# Patient Record
Sex: FEMALE | Race: WHITE | NOT HISPANIC OR LATINO | Employment: FULL TIME | URBAN - METROPOLITAN AREA
[De-identification: names, ages, dates, MRNs, and addresses within clinical notes are randomized per-mention and may not be internally consistent; named-entity substitution may affect disease eponyms.]

---

## 2023-10-25 ENCOUNTER — TELEPHONE (OUTPATIENT)
Dept: RHEUMATOLOGY | Facility: CLINIC | Age: 58
End: 2023-10-25
Payer: COMMERCIAL

## 2023-10-25 NOTE — TELEPHONE ENCOUNTER
Patient has an appointment 12/1.  She hopes to get in before 11/22/2023 for an injection.  Is it ok to put her in a 15 minute appointment.

## 2023-11-11 PROBLEM — E89.2 STATUS POST PARATHYROIDECTOMY (CMS/HCC): Status: RESOLVED | Noted: 2023-11-11 | Resolved: 2023-11-11

## 2023-11-11 PROBLEM — E89.2 STATUS POST PARATHYROIDECTOMY (CMS/HCC): Status: ACTIVE | Noted: 2023-11-11

## 2023-11-11 PROBLEM — M15.9 GENERALIZED OSTEOARTHRITIS: Status: ACTIVE | Noted: 2023-11-11

## 2023-11-11 PROBLEM — I10 BENIGN ESSENTIAL HYPERTENSION: Status: ACTIVE | Noted: 2023-11-11

## 2023-11-11 PROBLEM — E21.0 HYPERPARATHYROIDISM, PRIMARY (MULTI): Status: ACTIVE | Noted: 2023-11-11

## 2023-11-11 PROBLEM — I83.90 VARICOSE VEIN OF LEG: Status: ACTIVE | Noted: 2023-11-11

## 2023-11-11 RX ORDER — LEVOTHYROXINE SODIUM 100 UG/1
100 TABLET ORAL DAILY
COMMUNITY

## 2023-11-11 RX ORDER — NEBIVOLOL 10 MG/1
15 TABLET ORAL DAILY
COMMUNITY

## 2023-11-11 RX ORDER — CHOLECALCIFEROL (VITAMIN D3) 1250 MCG
1 TABLET ORAL
COMMUNITY

## 2023-11-11 RX ORDER — LOSARTAN POTASSIUM 100 MG/1
100 TABLET ORAL DAILY
COMMUNITY
Start: 2023-10-07

## 2023-11-15 ENCOUNTER — OFFICE VISIT (OUTPATIENT)
Dept: RHEUMATOLOGY | Facility: CLINIC | Age: 58
End: 2023-11-15
Payer: COMMERCIAL

## 2023-11-15 VITALS
SYSTOLIC BLOOD PRESSURE: 175 MMHG | HEART RATE: 61 BPM | WEIGHT: 204.5 LBS | DIASTOLIC BLOOD PRESSURE: 91 MMHG | BODY MASS INDEX: 34.07 KG/M2 | TEMPERATURE: 96.6 F | HEIGHT: 65 IN

## 2023-11-15 DIAGNOSIS — M15.9 GENERALIZED OSTEOARTHRITIS: Primary | ICD-10-CM

## 2023-11-15 PROBLEM — E11.9 TYPE 2 DIABETES MELLITUS, WITHOUT LONG-TERM CURRENT USE OF INSULIN (MULTI): Status: ACTIVE | Noted: 2022-12-08

## 2023-11-15 PROBLEM — E55.9 VITAMIN D DEFICIENCY: Status: RESOLVED | Noted: 2023-11-15 | Resolved: 2023-11-15

## 2023-11-15 PROCEDURE — 99213 OFFICE O/P EST LOW 20 MIN: CPT | Performed by: INTERNAL MEDICINE

## 2023-11-15 PROCEDURE — 20610 DRAIN/INJ JOINT/BURSA W/O US: CPT | Performed by: INTERNAL MEDICINE

## 2023-11-15 PROCEDURE — 1036F TOBACCO NON-USER: CPT | Performed by: INTERNAL MEDICINE

## 2023-11-15 PROCEDURE — 3077F SYST BP >= 140 MM HG: CPT | Performed by: INTERNAL MEDICINE

## 2023-11-15 PROCEDURE — 4010F ACE/ARB THERAPY RXD/TAKEN: CPT | Performed by: INTERNAL MEDICINE

## 2023-11-15 PROCEDURE — 3080F DIAST BP >= 90 MM HG: CPT | Performed by: INTERNAL MEDICINE

## 2023-11-15 RX ORDER — CALCIUM CARBONATE 600 MG
TABLET ORAL DAILY
COMMUNITY
Start: 2021-06-29

## 2023-11-15 RX ORDER — NABUMETONE 750 MG/1
750 TABLET, FILM COATED ORAL 2 TIMES DAILY PRN
Qty: 60 TABLET | Refills: 5 | Status: SHIPPED | OUTPATIENT
Start: 2023-11-15 | End: 2024-07-12

## 2023-11-15 RX ORDER — TRIAMCINOLONE ACETONIDE 40 MG/ML
40 INJECTION, SUSPENSION INTRA-ARTICULAR; INTRAMUSCULAR
Status: COMPLETED | OUTPATIENT
Start: 2023-11-15 | End: 2023-11-15

## 2023-11-15 RX ADMIN — TRIAMCINOLONE ACETONIDE 40 MG: 40 INJECTION, SUSPENSION INTRA-ARTICULAR; INTRAMUSCULAR at 11:44

## 2023-11-15 ASSESSMENT — PATIENT HEALTH QUESTIONNAIRE - PHQ9
2. FEELING DOWN, DEPRESSED OR HOPELESS: NOT AT ALL
1. LITTLE INTEREST OR PLEASURE IN DOING THINGS: NOT AT ALL
SUM OF ALL RESPONSES TO PHQ9 QUESTIONS 1 AND 2: 0

## 2023-11-15 ASSESSMENT — ENCOUNTER SYMPTOMS
MYALGIAS: 1
SHORTNESS OF BREATH: 0
HEADACHES: 0
WEAKNESS: 0
NUMBNESS: 0
DIZZINESS: 0
BRUISES/BLEEDS EASILY: 0
COLOR CHANGE: 0
UNEXPECTED WEIGHT CHANGE: 0
JOINT SWELLING: 1
COUGH: 0
BACK PAIN: 0
FEVER: 0
STIFFNESS: 1
ARTHRALGIAS: 1

## 2023-11-15 NOTE — PATIENT INSTRUCTIONS
It was a pleasure to see you today  Please call if your symptoms worsen  Please review your summary for education and reminders.  Follow up at your next appointment.    If you had labs/xrays done today, you will be able to view on [employer].   We will contact you when the results are reviewed for further discussion.  Please note that you may receive your results before I have had a chance to review.  Please know I will be contacting you for discussion  Homegoing instructions for all patient  A healthy lifestyle helps chronic diseases  These are all the goals you should strive to improve your overall health   Blood pressure <130/85   BMI of <30 or waist circumference that is 1/2 of your height   Fasting blood sugar <107 (if you are diabetic, aim for an A1c <6.4%_   LDL cholesterol <130   Avoid smoking   Manage your stress   Get your preventive exams   Get your immunizations

## 2023-11-15 NOTE — LETTER
November 15, 2023     Barney Montez MD  [address]  [facility], [address]  [facility] [address]    Patient: Yokasta Daniel   YOB: 1965   Date of Visit: 11/15/2023       Dear Dr. Barney Montez MD:    Thank you for referring Yokasta Daniel to me for evaluation. Below are my notes for this consultation.  If you have questions, please do not hesitate to call me. I look forward to following your patient along with you.       Sincerely,     Caty Das MD      CC: No Recipients  ______________________________________________________________________________________    Subjective   Patient ID: Yokasta Daniel is a 58 y.o. female who presents for Arthritis and Injections.  Pt needs injections of knees before she leaves to travel for the holiday.  Noting diffuse pain in most joints.   Meloxicam didn't really help    Arthritis  Presents for follow-up visit. She complains of pain, stiffness and joint swelling. Affected locations include the left knee, right knee, right MCP, left MCP, left wrist and right wrist. Pertinent negatives include no fever or rash.     Patient Active Problem List    Diagnosis Date Noted   • Benign essential hypertension 11/11/2023   • Generalized osteoarthritis 11/11/2023   • Hyperparathyroidism, primary (CMS/[facility]) 11/11/2023   • Varicose vein of leg 11/11/2023   • Type 2 diabetes mellitus, without long-term current use of insulin (CMS/[facility]) 12/08/2022     Current Outpatient Medications on File Prior to Visit   Medication Sig   • calcium carbonate 600 mg calcium (1,500 mg) tablet Take by mouth once daily.   • cholecalciferol (Vitamin D3) 1,250 mcg (50,000 unit) tablet Take 1 tablet (50,000 Units) by mouth 1 (one) time per week.   • levothyroxine (Synthroid, Levoxyl) 100 mcg tablet Take 1 tablet (100 mcg) by mouth once daily.   • losartan (Cozaar) 100 mg tablet Take 1 tablet (100 mg) by mouth once daily.   • mv-min/FA/vit K/lutein/zeaxant  "(PRESERVISION AREDS 2 PLUS MV ORAL) Take by mouth.   • nebivolol (Bystolic) 10 mg tablet Take 1.5 tablets (15 mg) by mouth once daily.     No current facility-administered medications on file prior to visit.     Allergies   Allergen Reactions   • Nitrofurantoin Monohyd/M-Cryst Other, Itching and Rash     30 years ago while pregnant   • Cephalexin GI Upset and Unknown     Kidney issues         Review of Systems   Constitutional:  Negative for fever and unexpected weight change.   HENT:  Negative for congestion.    Respiratory:  Negative for cough and shortness of breath.    Cardiovascular:  Positive for leg swelling.   Musculoskeletal:  Positive for arthralgias, arthritis, gait problem, joint swelling, myalgias and stiffness. Negative for back pain.   Skin:  Negative for color change and rash.   Neurological:  Negative for dizziness, weakness, numbness and headaches.   Hematological:  Does not bruise/bleed easily.       Objective  BP (!) 175/91   Pulse 61   Temp 35.9 °C (96.6 °F)   Ht 1.651 m (5' 5\")   Wt 92.8 kg (204 lb 8 oz)   BMI 34.03 kg/m²     Physical Exam  Vitals reviewed.   Constitutional:       General: She is not in acute distress.     Appearance: Normal appearance.   HENT:      Head: Normocephalic and atraumatic.   Eyes:      Conjunctiva/sclera: Conjunctivae normal.   Cardiovascular:      Comments: varicosities  Pulmonary:      Effort: Pulmonary effort is normal. No respiratory distress.   Musculoskeletal:         General: Swelling and tenderness present. No deformity.      Right lower leg: Edema present.      Left lower leg: Edema present.      Comments: Medial joint line tenderness of knees.  OA changes in hands   Skin:     Findings: No erythema or rash.   Neurological:      General: No focal deficit present.      Mental Status: She is alert.   Psychiatric:         Mood and Affect: Mood normal.     Patient ID: Yokasta Daniel is a 58 y.o. female.    Large Joint Injection/Arthrocentesis: bilateral knee " on 11/15/2023 11:44 AM  Indications: pain  Details: 25 G needle, medial approach  Medications (Right): 40 mg triamcinolone acetonide 40 mg/mL  Aspirate (Right): 0 mL  Medications (Left): 40 mg triamcinolone acetonide 40 mg/mL  Aspirate (Left): 0 mL  Outcome: tolerated well, no immediate complications  Procedure, treatment alternatives, risks and benefits explained, specific risks discussed. Consent was given by the patient. Immediately prior to procedure a time out was called to verify the correct patient, procedure, equipment, support staff and site/side marked as required. Patient was prepped and draped in the usual sterile fashion.           Assessment/Plan   Problem List Items Addressed This Visit             ICD-10-CM    Generalized osteoarthritis - Primary M15.9     OA of knees but noting more hand pain as well.  Injections done but also will start NSAID          Follow up 3-4 mo

## 2023-11-15 NOTE — PROGRESS NOTES
Subjective   Patient ID: Yokasta Daniel is a 58 y.o. female who presents for Arthritis and Injections.  Pt needs injections of knees before she leaves to travel for the holiday.  Noting diffuse pain in most joints.   Meloxicam didn't really help    Arthritis  Presents for follow-up visit. She complains of pain, stiffness and joint swelling. Affected locations include the left knee, right knee, right MCP, left MCP, left wrist and right wrist. Pertinent negatives include no fever or rash.     Patient Active Problem List    Diagnosis Date Noted    Benign essential hypertension 11/11/2023    Generalized osteoarthritis 11/11/2023    Hyperparathyroidism, primary (CMS/[facility]) 11/11/2023    Varicose vein of leg 11/11/2023    Type 2 diabetes mellitus, without long-term current use of insulin (CMS/[facility]) 12/08/2022     Current Outpatient Medications on File Prior to Visit   Medication Sig    calcium carbonate 600 mg calcium (1,500 mg) tablet Take by mouth once daily.    cholecalciferol (Vitamin D3) 1,250 mcg (50,000 unit) tablet Take 1 tablet (50,000 Units) by mouth 1 (one) time per week.    levothyroxine (Synthroid, Levoxyl) 100 mcg tablet Take 1 tablet (100 mcg) by mouth once daily.    losartan (Cozaar) 100 mg tablet Take 1 tablet (100 mg) by mouth once daily.    mv-min/FA/vit K/lutein/zeaxant (PRESERVISION AREDS 2 PLUS MV ORAL) Take by mouth.    nebivolol (Bystolic) 10 mg tablet Take 1.5 tablets (15 mg) by mouth once daily.     No current facility-administered medications on file prior to visit.     Allergies   Allergen Reactions    Nitrofurantoin Monohyd/M-Cryst Other, Itching and Rash     30 years ago while pregnant    Cephalexin GI Upset and Unknown     Kidney issues         Review of Systems   Constitutional:  Negative for fever and unexpected weight change.   HENT:  Negative for congestion.    Respiratory:  Negative for cough and shortness of breath.    Cardiovascular:  Positive for leg swelling.   Musculoskeletal:  Positive  "for arthralgias, arthritis, gait problem, joint swelling, myalgias and stiffness. Negative for back pain.   Skin:  Negative for color change and rash.   Neurological:  Negative for dizziness, weakness, numbness and headaches.   Hematological:  Does not bruise/bleed easily.       Objective  BP (!) 175/91   Pulse 61   Temp 35.9 °C (96.6 °F)   Ht 1.651 m (5' 5\")   Wt 92.8 kg (204 lb 8 oz)   BMI 34.03 kg/m²     Physical Exam  Vitals reviewed.   Constitutional:       General: She is not in acute distress.     Appearance: Normal appearance.   HENT:      Head: Normocephalic and atraumatic.   Eyes:      Conjunctiva/sclera: Conjunctivae normal.   Cardiovascular:      Comments: varicosities  Pulmonary:      Effort: Pulmonary effort is normal. No respiratory distress.   Musculoskeletal:         General: Swelling and tenderness present. No deformity.      Right lower leg: Edema present.      Left lower leg: Edema present.      Comments: Medial joint line tenderness of knees.  OA changes in hands   Skin:     Findings: No erythema or rash.   Neurological:      General: No focal deficit present.      Mental Status: She is alert.   Psychiatric:         Mood and Affect: Mood normal.     Patient ID: Yokasta Daniel is a 58 y.o. female.    Large Joint Injection/Arthrocentesis: bilateral knee on 11/15/2023 11:44 AM  Indications: pain  Details: 25 G needle, medial approach  Medications (Right): 40 mg triamcinolone acetonide 40 mg/mL  Aspirate (Right): 0 mL  Medications (Left): 40 mg triamcinolone acetonide 40 mg/mL  Aspirate (Left): 0 mL  Outcome: tolerated well, no immediate complications  Procedure, treatment alternatives, risks and benefits explained, specific risks discussed. Consent was given by the patient. Immediately prior to procedure a time out was called to verify the correct patient, procedure, equipment, support staff and site/side marked as required. Patient was prepped and draped in the usual sterile fashion. "           Assessment/Plan   Problem List Items Addressed This Visit             ICD-10-CM    Generalized osteoarthritis - Primary M15.9     OA of knees but noting more hand pain as well.  Injections done but also will start NSAID          Follow up 3-4 mo

## 2024-03-21 ENCOUNTER — OFFICE VISIT (OUTPATIENT)
Dept: RHEUMATOLOGY | Facility: CLINIC | Age: 59
End: 2024-03-21
Payer: COMMERCIAL

## 2024-03-21 VITALS
TEMPERATURE: 96.8 F | WEIGHT: 197.9 LBS | DIASTOLIC BLOOD PRESSURE: 86 MMHG | HEIGHT: 65 IN | BODY MASS INDEX: 32.97 KG/M2 | OXYGEN SATURATION: 99 % | HEART RATE: 67 BPM | SYSTOLIC BLOOD PRESSURE: 175 MMHG

## 2024-03-21 DIAGNOSIS — E21.0 HYPERPARATHYROIDISM, PRIMARY (MULTI): ICD-10-CM

## 2024-03-21 DIAGNOSIS — I83.813 VARICOSE VEINS OF BOTH LOWER EXTREMITIES WITH PAIN: ICD-10-CM

## 2024-03-21 DIAGNOSIS — E11.9 TYPE 2 DIABETES MELLITUS WITHOUT COMPLICATION, WITHOUT LONG-TERM CURRENT USE OF INSULIN (MULTI): ICD-10-CM

## 2024-03-21 DIAGNOSIS — M15.9 GENERALIZED OSTEOARTHRITIS: Primary | ICD-10-CM

## 2024-03-21 PROCEDURE — 3079F DIAST BP 80-89 MM HG: CPT | Performed by: INTERNAL MEDICINE

## 2024-03-21 PROCEDURE — 20610 DRAIN/INJ JOINT/BURSA W/O US: CPT | Performed by: INTERNAL MEDICINE

## 2024-03-21 PROCEDURE — 99213 OFFICE O/P EST LOW 20 MIN: CPT | Performed by: INTERNAL MEDICINE

## 2024-03-21 PROCEDURE — 1036F TOBACCO NON-USER: CPT | Performed by: INTERNAL MEDICINE

## 2024-03-21 PROCEDURE — 3077F SYST BP >= 140 MM HG: CPT | Performed by: INTERNAL MEDICINE

## 2024-03-21 PROCEDURE — 4010F ACE/ARB THERAPY RXD/TAKEN: CPT | Performed by: INTERNAL MEDICINE

## 2024-03-21 RX ORDER — TRIAMCINOLONE ACETONIDE 40 MG/ML
2.5 INJECTION, SUSPENSION INTRA-ARTICULAR; INTRAMUSCULAR
Status: COMPLETED | OUTPATIENT
Start: 2024-03-21 | End: 2024-03-21

## 2024-03-21 RX ADMIN — TRIAMCINOLONE ACETONIDE 2.5 MG: 40 INJECTION, SUSPENSION INTRA-ARTICULAR; INTRAMUSCULAR at 11:29

## 2024-03-21 ASSESSMENT — ENCOUNTER SYMPTOMS
SHORTNESS OF BREATH: 0
WEAKNESS: 0
FEVER: 0
COLOR CHANGE: 0
COUGH: 0
ARTHRALGIAS: 1
MYALGIAS: 0
BACK PAIN: 0
NUMBNESS: 0
JOINT SWELLING: 1
FATIGUE: 0

## 2024-03-21 ASSESSMENT — PATIENT HEALTH QUESTIONNAIRE - PHQ9
1. LITTLE INTEREST OR PLEASURE IN DOING THINGS: NOT AT ALL
SUM OF ALL RESPONSES TO PHQ9 QUESTIONS 1 & 2: 0
2. FEELING DOWN, DEPRESSED OR HOPELESS: NOT AT ALL

## 2024-03-21 NOTE — PATIENT INSTRUCTIONS
It was a pleasure to see you today  Please call if your symptoms worsen  Please review your summary for education and reminders.  Follow up at your next appointment.    If you had labs/xrays done today, you will be able to view on [employer].   We will contact you when the results are reviewed for further discussion.  Please note that you may receive your results before I have had a chance to review.  Please know I will be contacting you for discussion  Homegoing instructions for all patient  A healthy lifestyle helps chronic diseases  These are all the goals you should strive to improve your overall health   Blood pressure <130/85   BMI of <30 or waist circumference that is 1/2 of your height   Fasting blood sugar <107 (if you are diabetic, aim for an A1c <6.4%_   LDL cholesterol <130   Avoid smoking   Manage your stress   Get your preventive exams   Get your immunizations

## 2024-03-21 NOTE — PROGRESS NOTES
RHEUMATOLOGY/PRIMARY CARE PROGRESS NOTE  Yokasta Daniel 58 y.o. female  Chief Complaint   Patient presents with    Follow-up    Osteoarthritis     Injection        SUBJECTIVE  Re:  arthritis.  Pt reports overall doing well.  Knees are more painful and R knee has more of an ache (pt points to the area and there is a bulging varicose vein there).  Pt desires injections as they do help alleviate the pain.  Otherwise, no new areas of pain        Patient Active Problem List    Diagnosis Date Noted    Benign essential hypertension 11/11/2023    Generalized osteoarthritis 11/11/2023    Hyperparathyroidism, primary (CMS/[facility]) 11/11/2023    Varicose vein of leg 11/11/2023    Type 2 diabetes mellitus, without long-term current use of insulin (CMS/[facility]) 12/08/2022     Past Medical History:   Diagnosis Date    Consumes 2 to 3 servings of caffeine per day     Personal history of gestational diabetes     History of gestational diabetes mellitus (GDM)    Vitamin D deficiency 11/15/2023     Current Outpatient Medications   Medication Instructions    calcium carbonate 600 mg calcium (1,500 mg) tablet oral, Daily    cholecalciferol (Vitamin D3) 1,250 mcg (50,000 unit) tablet 1 tablet, oral, Weekly    levothyroxine (SYNTHROID, LEVOXYL) 100 mcg, oral, Daily    losartan (COZAAR) 100 mg, oral, Daily    mv-min/FA/vit K/lutein/zeaxant (PRESERVISION AREDS 2 PLUS MV ORAL) oral    nabumetone (RELAFEN) 750 mg, oral, 2 times daily PRN    nebivolol (BYSTOLIC) 15 mg, oral, Daily     Allergies   Allergen Reactions    Nitrofurantoin Monohyd/M-Cryst Other, Itching and Rash     30 years ago while pregnant    Cephalexin GI Upset and Unknown     Kidney issues     Review of Systems   Constitutional:  Negative for fatigue and fever.   Respiratory:  Negative for cough and shortness of breath.    Cardiovascular:  Negative for leg swelling.   Musculoskeletal:  Positive for arthralgias and joint swelling. Negative for back pain, gait problem and myalgias.   Skin:  " Negative for color change and rash.   Neurological:  Negative for weakness and numbness.       PHYSICAL EXAM  /86   Pulse 67   Temp 36 °C (96.8 °F) (Temporal)   Ht 1.651 m (5' 5\")   Wt 89.8 kg (197 lb 14.4 oz)   SpO2 99%   BMI 32.93 kg/m²   Physical Exam  Vitals reviewed.   Constitutional:       General: She is not in acute distress.     Appearance: Normal appearance.   HENT:      Head: Normocephalic and atraumatic.   Eyes:      Extraocular Movements: Extraocular movements intact.      Conjunctiva/sclera: Conjunctivae normal.      Pupils: Pupils are equal, round, and reactive to light.   Cardiovascular:      Comments: Large varicosities  Pulmonary:      Effort: Pulmonary effort is normal. No respiratory distress.   Musculoskeletal:         General: Swelling (infrapatellar swelling R knee) and tenderness (medial joint line tenderness B knees) present. No deformity.      Cervical back: Normal range of motion.      Right lower leg: No edema.      Left lower leg: No edema.      Comments: No synovitis of examined joints   Skin:     Coloration: Skin is not pale.      Findings: No erythema or rash.   Neurological:      General: No focal deficit present.      Mental Status: She is alert and oriented to person, place, and time. Mental status is at baseline.      Gait: Gait normal.   Psychiatric:         Mood and Affect: Mood normal.       Patient ID: Yokasta Daniel is a 58 y.o. female.    Large Joint Injection/Arthrocentesis: bilateral knee on 3/21/2024 11:29 AM  Indications: pain  Details: 25 G needle, medial approach  Medications (Right): 2.5 mg triamcinolone acetonide 40 mg/mL  Medications (Left): 2.5 mg triamcinolone acetonide 40 mg/mL  Outcome: tolerated well, no immediate complications  Procedure, treatment alternatives, risks and benefits explained, specific risks discussed. Consent was given by the patient. Immediately prior to procedure a time out was called to verify the correct patient, procedure, " equipment, support staff and site/side marked as required. Patient was prepped and draped in the usual sterile fashion.         Assessment/plan  Problem List Items Addressed This Visit       Generalized osteoarthritis - Primary    Current Assessment & Plan     Injections done for relief of symptoms   Pt to let me know if not improve         Hyperparathyroidism, primary (CMS/[facility])    Current Assessment & Plan     Clinically asymptomatic.   Monitored by PCP         Varicose vein of leg    Current Assessment & Plan     Increased leg pain may be due to her varicosities (pt stands all day at work)         Type 2 diabetes mellitus, without long-term current use of insulin (CMS/[facility])    Overview     Last Assessment & Plan: Formatting of this note might be different from the original. Assessment: borderline. Managed with amaryl if blood sugar is above 150. Check updated hgbA1C today         Current Assessment & Plan     Stable   Managed by PCP          Follow up: ___3-4__months

## 2024-03-21 NOTE — LETTER
March 21, 2024     Barney Montez MD  [address]  [facility], [address]  [place] [address]    Patient: Yokasta Daniel   YOB: 1965   Date of Visit: 3/21/2024       Dear Dr. Barney Montez MD:    Thank you for referring Yokasta Daniel to me for evaluation. Below are my notes for this consultation.  If you have questions, please do not hesitate to call me. I look forward to following your patient along with you.       Sincerely,     Caty Das MD      CC: No Recipients  ______________________________________________________________________________________    RHEUMATOLOGY/PRIMARY CARE PROGRESS NOTE  Yokasta Daniel 58 y.o. female  Chief Complaint   Patient presents with   • Follow-up   • Osteoarthritis     Injection        SUBJECTIVE  Re:  arthritis.  Pt reports overall doing well.  Knees are more painful and R knee has more of an ache (pt points to the area and there is a bulging varicose vein there).  Pt desires injections as they do help alleviate the pain.  Otherwise, no new areas of pain        Patient Active Problem List    Diagnosis Date Noted   • Benign essential hypertension 11/11/2023   • Generalized osteoarthritis 11/11/2023   • Hyperparathyroidism, primary (CMS/[facility]) 11/11/2023   • Varicose vein of leg 11/11/2023   • Type 2 diabetes mellitus, without long-term current use of insulin (CMS/[facility]) 12/08/2022     Past Medical History:   Diagnosis Date   • Consumes 2 to 3 servings of caffeine per day    • Personal history of gestational diabetes     History of gestational diabetes mellitus (GDM)   • Vitamin D deficiency 11/15/2023     Current Outpatient Medications   Medication Instructions   • calcium carbonate 600 mg calcium (1,500 mg) tablet oral, Daily   • cholecalciferol (Vitamin D3) 1,250 mcg (50,000 unit) tablet 1 tablet, oral, Weekly   • levothyroxine (SYNTHROID, LEVOXYL) 100 mcg, oral, Daily   • losartan (COZAAR) 100 mg, oral, Daily   •  "mv-min/FA/vit K/lutein/zeaxant (PRESERVISION AREDS 2 PLUS MV ORAL) oral   • nabumetone (RELAFEN) 750 mg, oral, 2 times daily PRN   • nebivolol (BYSTOLIC) 15 mg, oral, Daily     Allergies   Allergen Reactions   • Nitrofurantoin Monohyd/M-Cryst Other, Itching and Rash     30 years ago while pregnant   • Cephalexin GI Upset and Unknown     Kidney issues     Review of Systems   Constitutional:  Negative for fatigue and fever.   Respiratory:  Negative for cough and shortness of breath.    Cardiovascular:  Negative for leg swelling.   Musculoskeletal:  Positive for arthralgias and joint swelling. Negative for back pain, gait problem and myalgias.   Skin:  Negative for color change and rash.   Neurological:  Negative for weakness and numbness.       PHYSICAL EXAM  /86   Pulse 67   Temp 36 °C (96.8 °F) (Temporal)   Ht 1.651 m (5' 5\")   Wt 89.8 kg (197 lb 14.4 oz)   SpO2 99%   BMI 32.93 kg/m²   Physical Exam  Vitals reviewed.   Constitutional:       General: She is not in acute distress.     Appearance: Normal appearance.   HENT:      Head: Normocephalic and atraumatic.   Eyes:      Extraocular Movements: Extraocular movements intact.      Conjunctiva/sclera: Conjunctivae normal.      Pupils: Pupils are equal, round, and reactive to light.   Cardiovascular:      Comments: Large varicosities  Pulmonary:      Effort: Pulmonary effort is normal. No respiratory distress.   Musculoskeletal:         General: Swelling (infrapatellar swelling R knee) and tenderness (medial joint line tenderness B knees) present. No deformity.      Cervical back: Normal range of motion.      Right lower leg: No edema.      Left lower leg: No edema.      Comments: No synovitis of examined joints   Skin:     Coloration: Skin is not pale.      Findings: No erythema or rash.   Neurological:      General: No focal deficit present.      Mental Status: She is alert and oriented to person, place, and time. Mental status is at baseline.      Gait: " Gait normal.   Psychiatric:         Mood and Affect: Mood normal.       Patient ID: Yokasta Daniel is a 58 y.o. female.    Large Joint Injection/Arthrocentesis: bilateral knee on 3/21/2024 11:29 AM  Indications: pain  Details: 25 G needle, medial approach  Medications (Right): 2.5 mg triamcinolone acetonide 40 mg/mL  Medications (Left): 2.5 mg triamcinolone acetonide 40 mg/mL  Outcome: tolerated well, no immediate complications  Procedure, treatment alternatives, risks and benefits explained, specific risks discussed. Consent was given by the patient. Immediately prior to procedure a time out was called to verify the correct patient, procedure, equipment, support staff and site/side marked as required. Patient was prepped and draped in the usual sterile fashion.         Assessment/plan  Problem List Items Addressed This Visit       Generalized osteoarthritis - Primary    Current Assessment & Plan     Injections done for relief of symptoms   Pt to let me know if not improve         Hyperparathyroidism, primary (CMS/[facility])    Current Assessment & Plan     Clinically asymptomatic.   Monitored by PCP         Varicose vein of leg    Current Assessment & Plan     Increased leg pain may be due to her varicosities (pt stands all day at work)         Type 2 diabetes mellitus, without long-term current use of insulin (CMS/[facility])    Overview     Last Assessment & Plan: Formatting of this note might be different from the original. Assessment: borderline. Managed with amaryl if blood sugar is above 150. Check updated hgbA1C today         Current Assessment & Plan     Stable   Managed by PCP          Follow up: ___3-4__months

## 2024-08-01 ENCOUNTER — APPOINTMENT (OUTPATIENT)
Dept: RHEUMATOLOGY | Facility: CLINIC | Age: 59
End: 2024-08-01
Payer: COMMERCIAL

## 2025-03-30 RX ORDER — HYDROCHLOROTHIAZIDE 25 MG/1
TABLET ORAL
COMMUNITY
Start: 2024-08-12

## 2025-04-02 ENCOUNTER — APPOINTMENT (OUTPATIENT)
Dept: RHEUMATOLOGY | Facility: CLINIC | Age: 60
End: 2025-04-02
Payer: COMMERCIAL

## 2025-04-02 VITALS
HEART RATE: 64 BPM | TEMPERATURE: 98.1 F | BODY MASS INDEX: 31.87 KG/M2 | HEIGHT: 65 IN | WEIGHT: 191.3 LBS | DIASTOLIC BLOOD PRESSURE: 74 MMHG | SYSTOLIC BLOOD PRESSURE: 136 MMHG

## 2025-04-02 DIAGNOSIS — I83.813 VARICOSE VEINS OF BOTH LOWER EXTREMITIES WITH PAIN: ICD-10-CM

## 2025-04-02 DIAGNOSIS — E11.69 TYPE 2 DIABETES MELLITUS WITH OTHER SPECIFIED COMPLICATION, WITHOUT LONG-TERM CURRENT USE OF INSULIN: ICD-10-CM

## 2025-04-02 DIAGNOSIS — E21.3 HYPERPARATHYROIDISM, UNSPECIFIED (MULTI): ICD-10-CM

## 2025-04-02 DIAGNOSIS — M15.9 GENERALIZED OSTEOARTHRITIS: Primary | ICD-10-CM

## 2025-04-02 PROBLEM — E03.9 HYPOTHYROIDISM: Status: ACTIVE | Noted: 2025-03-11

## 2025-04-02 PROBLEM — M72.2 PLANTAR FASCIITIS: Status: RESOLVED | Noted: 2025-04-02 | Resolved: 2025-04-02

## 2025-04-02 PROCEDURE — 3008F BODY MASS INDEX DOCD: CPT | Performed by: INTERNAL MEDICINE

## 2025-04-02 PROCEDURE — 3078F DIAST BP <80 MM HG: CPT | Performed by: INTERNAL MEDICINE

## 2025-04-02 PROCEDURE — 99213 OFFICE O/P EST LOW 20 MIN: CPT | Performed by: INTERNAL MEDICINE

## 2025-04-02 PROCEDURE — 20610 DRAIN/INJ JOINT/BURSA W/O US: CPT | Performed by: INTERNAL MEDICINE

## 2025-04-02 PROCEDURE — 1036F TOBACCO NON-USER: CPT | Performed by: INTERNAL MEDICINE

## 2025-04-02 PROCEDURE — 4010F ACE/ARB THERAPY RXD/TAKEN: CPT | Performed by: INTERNAL MEDICINE

## 2025-04-02 PROCEDURE — 3075F SYST BP GE 130 - 139MM HG: CPT | Performed by: INTERNAL MEDICINE

## 2025-04-02 RX ORDER — TRIAMCINOLONE ACETONIDE 40 MG/ML
2.5 INJECTION, SUSPENSION INTRA-ARTICULAR; INTRAMUSCULAR
Status: COMPLETED | OUTPATIENT
Start: 2025-04-02 | End: 2025-04-02

## 2025-04-02 RX ORDER — LEVOTHYROXINE SODIUM 112 UG/1
TABLET ORAL
COMMUNITY
Start: 2025-03-24

## 2025-04-02 RX ADMIN — TRIAMCINOLONE ACETONIDE 2.5 MG: 40 INJECTION, SUSPENSION INTRA-ARTICULAR; INTRAMUSCULAR at 16:35

## 2025-04-02 ASSESSMENT — PATIENT HEALTH QUESTIONNAIRE - PHQ9
1. LITTLE INTEREST OR PLEASURE IN DOING THINGS: NOT AT ALL
2. FEELING DOWN, DEPRESSED OR HOPELESS: NOT AT ALL
SUM OF ALL RESPONSES TO PHQ9 QUESTIONS 1 AND 2: 0

## 2025-04-02 ASSESSMENT — ENCOUNTER SYMPTOMS
FEVER: 0
GASTROINTESTINAL NEGATIVE: 1
ENDOCRINE NEGATIVE: 1
SHORTNESS OF BREATH: 0
ARTHRALGIAS: 1
FATIGUE: 0
BACK PAIN: 0
COUGH: 0
WEAKNESS: 0
COLOR CHANGE: 0
PSYCHIATRIC NEGATIVE: 1
JOINT SWELLING: 0
NUMBNESS: 0
MYALGIAS: 0
EYES NEGATIVE: 1

## 2025-04-02 NOTE — PATIENT INSTRUCTIONS
It was a pleasure to see you today  Please call if your symptoms worsen  Please review your summary for education and reminders.  Follow up at your next appointment.    If you had labs/xrays done today, you will be able to view on [employer].   We will contact you when the results are reviewed for further discussion.  Please note that you may receive your results before I have had a chance to review.  Please know I will be contacting you for discussion  Homegoing instructions for all patient  A healthy lifestyle helps chronic diseases  These are all the goals you should strive to improve your overall health   Blood pressure <130/85   BMI of <30 or waist circumference that is 1/2 of your height   Fasting blood sugar <107 (if you are diabetic, aim for an A1c <6.4%_   LDL cholesterol <130   Avoid smoking   Manage your stress   Get your preventive exams   Get your immunizations

## 2025-04-02 NOTE — ASSESSMENT & PLAN NOTE
Injection done for relief of symptoms   Pt advised that if not completely resolved, consider evaluation of her large varicose veins

## 2025-04-02 NOTE — ASSESSMENT & PLAN NOTE
No clinical signs of disease progression since last visit.  No increase in symptoms  Recent labs/imaging/procedures reviewed  Medical records reviewed  Disease process and pertinent med refills are managed by specialist and PCP.    I reviewed that no conflict in medications with the current condition I am treating

## 2025-04-02 NOTE — PROGRESS NOTES
[facility] RHEUMATOLOGY     AND INTERNAL MEDICINE    RHEUMATOLOGY PROGRESS NOTE    Yokasta Daniel 59 y.o. female  :  1965  PCP:  Barney Montez MD    Chief Complaint   Patient presents with    Osteoarthritis     Pt here for injections       SUBJECTIVE  Pt working at a different location and using a lot more steps.   Desires injections of both knees      Records since last seen reviewed in [facility], [facility] and [facility] Record  Patient Active Problem List    Diagnosis Date Noted    Benign essential hypertension 2023    Generalized osteoarthritis 2023    Hyperparathyroidism, primary (Multi) 2023    Varicose vein of leg 2023    Type 2 diabetes mellitus, without long-term current use of insulin (Multi) 2022     Past Medical History:   Diagnosis Date    Consumes 2 to 3 servings of caffeine per day     Personal history of gestational diabetes     History of gestational diabetes mellitus (GDM)    Vitamin D deficiency 11/15/2023     Current Outpatient Medications on File Prior to Visit   Medication Sig Dispense Refill    hydroCHLOROthiazide (HYDRODiuril) 25 mg tablet       calcium carbonate 600 mg calcium (1,500 mg) tablet Take by mouth once daily.      cholecalciferol (Vitamin D3) 1,250 mcg (50,000 unit) tablet Take 1 tablet (50,000 Units) by mouth 1 (one) time per week.      levothyroxine (Synthroid, Levoxyl) 100 mcg tablet Take 1 tablet (100 mcg) by mouth once daily.      losartan (Cozaar) 100 mg tablet Take 1 tablet (100 mg) by mouth once daily.      mv-min/FA/vit K/lutein/zeaxant (PRESERVISION AREDS 2 PLUS MV ORAL) Take by mouth.      nebivolol (Bystolic) 20 mg tablet Take 1 tablet (20 mg) by mouth once daily.      [DISCONTINUED] hydroCHLOROthiazide (Microzide) 12.5 mg tablet Take 1 tablet (12.5 mg) by mouth once daily.       No current facility-administered medications on file prior to visit.     Allergies   Allergen  "Reactions    Nitrofurantoin Monohyd/M-Cryst Other, Itching and Rash     30 years ago while pregnant    Cephalexin GI Upset and Unknown     Kidney issues     Social History     Tobacco Use    Smoking status: Never    Smokeless tobacco: Never   Substance Use Topics    Alcohol use: Yes     Comment: Occasionally    Drug use: Never     Review of Systems   Constitutional:  Negative for fatigue and fever.   HENT: Negative.     Eyes: Negative.    Respiratory:  Negative for cough and shortness of breath.    Cardiovascular:  Positive for leg swelling.   Gastrointestinal: Negative.    Endocrine: Negative.    Genitourinary: Negative.    Musculoskeletal:  Positive for arthralgias. Negative for back pain, gait problem, joint swelling and myalgias.   Skin:  Negative for color change and rash.   Neurological:  Negative for weakness and numbness.   Psychiatric/Behavioral: Negative.         PHYSICAL EXAM  /74   Pulse 64   Temp 36.7 °C (98.1 °F)   Ht 1.651 m (5' 5\")   Wt 86.8 kg (191 lb 4.8 oz)   BMI 31.83 kg/m²   Depression: Not at risk (4/2/2025)    PHQ-2     PHQ-2 Score: 0     Physical Exam  Vitals reviewed.   Constitutional:       General: She is not in acute distress.     Appearance: Normal appearance.   HENT:      Head: Normocephalic and atraumatic.   Eyes:      Extraocular Movements: Extraocular movements intact.      Conjunctiva/sclera: Conjunctivae normal.      Pupils: Pupils are equal, round, and reactive to light.   Cardiovascular:      Comments: Large varicosities  Pulmonary:      Effort: Pulmonary effort is normal. No respiratory distress.   Musculoskeletal:         General: Swelling (infrapatellar swelling R knee) and tenderness (medial joint line tenderness B knees) present. No deformity.      Cervical back: Normal range of motion.      Right lower leg: No edema.      Left lower leg: No edema.      Comments: No synovitis of examined joints   Skin:     Coloration: Skin is not pale.      Findings: No erythema " or rash.   Neurological:      General: No focal deficit present.      Mental Status: She is alert and oriented to person, place, and time. Mental status is at baseline.      Gait: Gait normal.   Psychiatric:         Mood and Affect: Mood normal.     Patient ID: Yokasta Daniel is a 59 y.o. female.    Large Joint Injection/Arthrocentesis: bilateral knee on 4/2/2025 4:35 PM  Indications: pain  Details: 25 G needle, medial approach  Medications (Right): 2.5 mg triamcinolone acetonide 40 mg/mL  Medications (Left): 2.5 mg triamcinolone acetonide 40 mg/mL  Outcome: tolerated well, no immediate complications  Procedure, treatment alternatives, risks and benefits explained, specific risks discussed. Consent was given by the patient. Immediately prior to procedure a time out was called to verify the correct patient, procedure, equipment, support staff and site/side marked as required. Patient was prepped and draped in the usual sterile fashion.             Health Maintenance Due   Topic Date Due    TSH Level  Never done    Yearly Adult Physical  Never done    Lipid Panel  Never done    HIV Screening  Never done    Colorectal Cancer Screening  Never done    Hepatitis C Screening  Never done    Pneumococcal Vaccine (1 of 2 - PCV) Never done    DTaP/Tdap/Td Vaccines (1 - Tdap) Never done    Cervical Cancer Screening  10/18/2002    Mammogram  Never done    Zoster Vaccines (1 of 2) Never done    Diabetes: Hemoglobin A1C  03/08/2023    Diabetes: Urine Protein Screening  05/21/2023    Bone Density Scan  05/27/2024    COVID-19 Vaccine (4 - 2024-25 season) 09/01/2024       Assessment/plan  Assessment & Plan  Generalized osteoarthritis  Injection done for relief of symptoms   Pt advised that if not completely resolved, consider evaluation of her large varicose veins       Type 2 diabetes mellitus with other specified complication, without long-term current use of insulin  No clinical signs of disease progression since last visit.  No  increase in symptoms  Recent labs/imaging/procedures reviewed  Medical records reviewed  Disease process and pertinent med refills are managed by specialist and PCP.    I reviewed that no conflict in medications with the current condition I am treating           Hyperparathyroidism, unspecified (Multi)  No clinical signs of disease progression since last visit.  No increase in symptoms  Recent labs/imaging/procedures reviewed  Medical records reviewed  Disease process and pertinent med refills are managed by specialist and PCP.    I reviewed that no conflict in medications with the current condition I am treating           Varicose veins of both lower extremities with pain           Follow up: __4___months, sooner if change in symptoms    Immunization History   Administered Date(s) Administered    Flu vaccine (IIV4), preservative free *Check age/dose* 01/20/2017, 10/31/2019, 11/09/2020, 10/04/2021    Influenza, injectable, quadrivalent 10/09/2017    Influenza, injectable, quadrivalent, preservative free, pediatric 10/31/2019, 11/09/2020, 10/04/2021    Moderna SARS-CoV-2 Vaccination 03/24/2021, 04/21/2021, 11/09/2021    PPD Test 08/28/2015

## 2025-08-18 ENCOUNTER — APPOINTMENT (OUTPATIENT)
Dept: RHEUMATOLOGY | Facility: CLINIC | Age: 60
End: 2025-08-18
Payer: COMMERCIAL